# Patient Record
Sex: FEMALE | URBAN - METROPOLITAN AREA
[De-identification: names, ages, dates, MRNs, and addresses within clinical notes are randomized per-mention and may not be internally consistent; named-entity substitution may affect disease eponyms.]

---

## 2021-06-29 ENCOUNTER — APPOINTMENT (RX ONLY)
Dept: URBAN - METROPOLITAN AREA CLINIC 82 | Facility: CLINIC | Age: 51
Setting detail: DERMATOLOGY
End: 2021-06-29

## 2021-06-29 DIAGNOSIS — T49.0X5 ADVERSE EFFECT OF LOCAL ANTIFUNGAL, ANTI-INFECTIVE AND ANTI-INFLAMMATORY DRUGS: ICD-10-CM

## 2021-06-29 DIAGNOSIS — L88 PYODERMA GANGRENOSUM: ICD-10-CM

## 2021-06-29 PROBLEM — T49.0X5A ADVERSE EFFECT OF LOCAL ANTIFUNGAL, ANTI-INFECTIVE AND ANTI-INFLAMMATORY DRUGS, INITIAL ENCOUNTER: Status: ACTIVE | Noted: 2021-06-29

## 2021-06-29 PROCEDURE — 99203 OFFICE O/P NEW LOW 30 MIN: CPT

## 2021-06-29 PROCEDURE — ? TREATMENT REGIMEN

## 2021-06-29 PROCEDURE — ? COUNSELING

## 2021-06-29 ASSESSMENT — LOCATION DETAILED DESCRIPTION DERM
LOCATION DETAILED: LEFT DORSAL FOOT
LOCATION DETAILED: RIGHT DORSAL FOOT

## 2021-06-29 ASSESSMENT — LOCATION SIMPLE DESCRIPTION DERM
LOCATION SIMPLE: LEFT FOOT
LOCATION SIMPLE: RIGHT FOOT

## 2021-06-29 ASSESSMENT — LOCATION ZONE DERM: LOCATION ZONE: FEET

## 2021-06-29 NOTE — PROCEDURE: COUNSELING
Detail Level: Detailed
Patient Specific Counseling (Will Not Stick From Patient To Patient): at this time, areas of PG have almost fully healed. Told to discuss prednisone taper with her PCP as I do not see the need for continued use.  I also explained steroid atrophy and appropraite use of topical steroids.  told to use tiny amount of clobetasol to area around any ulcers only- D/C spreading to foot/ankle or using on areas of scarring.  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  told to use vaseline only.  \\nexplained scarring and discoloration can continue to fade with time and realistic expectations discussed.  patient is tearful today. \\nIf new lesions occur in the future, the could consider ILK.  continue is very mild and almost fully resolved now so I do not see the benefit of ILK today. \\nI also discussed f/u with her prior dermatology practice should condition worsen again in the future.\\ncontinue to cover to avoid friction to the area until all ulcers have healed fully.

## 2021-06-29 NOTE — PROCEDURE: TREATMENT REGIMEN
Discontinue Regimen: spreading clobetasol to entire foot and ankle. told to continue using just on active ulcer(s). appropriate use of steroid discussed in great detail today. told to use vaseline only to normal skin.
Plan: told to continue prednisone taper as directed by pcp. told should this condition worsen, follow up with previous dermatologist.
Detail Level: Zone

## 2021-06-29 NOTE — PROCEDURE: MIPS QUALITY
Detail Level: Detailed
Quality 130: Documentation Of Current Medications In The Medical Record: Current Medications Documented
Quality 226: Preventive Care And Screening: Tobacco Use: Screening And Cessation Intervention: Patient screened for tobacco use and is an ex/non-smoker
no